# Patient Record
Sex: FEMALE | Race: ASIAN | NOT HISPANIC OR LATINO | Employment: FULL TIME | URBAN - METROPOLITAN AREA
[De-identification: names, ages, dates, MRNs, and addresses within clinical notes are randomized per-mention and may not be internally consistent; named-entity substitution may affect disease eponyms.]

---

## 2021-03-02 ENCOUNTER — HOSPITAL ENCOUNTER (OUTPATIENT)
Facility: MEDICAL CENTER | Age: 36
End: 2021-03-02
Attending: OBSTETRICS & GYNECOLOGY | Admitting: OBSTETRICS & GYNECOLOGY
Payer: COMMERCIAL

## 2022-08-13 LAB
HBV SURFACE AG SERPL QL IA: NORMAL
HIV 1+2 AB+HIV1 P24 AG SERPL QL IA: NORMAL

## 2022-11-05 LAB
ABO GROUP BLD: NORMAL
BLD GP AB SCN SERPL QL: NORMAL
RH BLD: NORMAL
RUBV IGG SERPL IA-ACNC: 9.1

## 2022-11-10 LAB
C TRACH DNA GENITAL QL NAA+PROBE: NORMAL
N GONORRHOEA DNA GENITAL QL NAA+PROBE: NORMAL

## 2023-04-25 LAB — GP B STREP DNA SPEC QL NAA+PROBE: NORMAL

## 2023-05-10 ENCOUNTER — HOSPITAL ENCOUNTER (INPATIENT)
Facility: MEDICAL CENTER | Age: 38
LOS: 2 days | End: 2023-05-13
Attending: OBSTETRICS & GYNECOLOGY | Admitting: OBSTETRICS & GYNECOLOGY
Payer: COMMERCIAL

## 2023-05-10 PROCEDURE — 302449 STATCHG TRIAGE ONLY (STATISTIC)

## 2023-05-11 ENCOUNTER — ANESTHESIA EVENT (OUTPATIENT)
Dept: ANESTHESIOLOGY | Facility: MEDICAL CENTER | Age: 38
End: 2023-05-11
Payer: COMMERCIAL

## 2023-05-11 ENCOUNTER — ANESTHESIA (OUTPATIENT)
Dept: ANESTHESIOLOGY | Facility: MEDICAL CENTER | Age: 38
End: 2023-05-11
Payer: COMMERCIAL

## 2023-05-11 LAB
BASOPHILS # BLD AUTO: 0.3 % (ref 0–1.8)
BASOPHILS # BLD: 0.03 K/UL (ref 0–0.12)
EOSINOPHIL # BLD AUTO: 0.08 K/UL (ref 0–0.51)
EOSINOPHIL NFR BLD: 0.7 % (ref 0–6.9)
ERYTHROCYTE [DISTWIDTH] IN BLOOD BY AUTOMATED COUNT: 43.6 FL (ref 35.9–50)
HCT VFR BLD AUTO: 39.2 % (ref 37–47)
HGB BLD-MCNC: 13.3 G/DL (ref 12–16)
HOLDING TUBE BB 8507: NORMAL
IMM GRANULOCYTES # BLD AUTO: 0.06 K/UL (ref 0–0.11)
IMM GRANULOCYTES NFR BLD AUTO: 0.5 % (ref 0–0.9)
LYMPHOCYTES # BLD AUTO: 2.15 K/UL (ref 1–4.8)
LYMPHOCYTES NFR BLD: 18.3 % (ref 22–41)
MCH RBC QN AUTO: 29.8 PG (ref 27–33)
MCHC RBC AUTO-ENTMCNC: 33.9 G/DL (ref 33.6–35)
MCV RBC AUTO: 87.7 FL (ref 81.4–97.8)
MONOCYTES # BLD AUTO: 0.82 K/UL (ref 0–0.85)
MONOCYTES NFR BLD AUTO: 7 % (ref 0–13.4)
NEUTROPHILS # BLD AUTO: 8.62 K/UL (ref 2–7.15)
NEUTROPHILS NFR BLD: 73.2 % (ref 44–72)
NRBC # BLD AUTO: 0 K/UL
NRBC BLD-RTO: 0 /100 WBC
PLATELET # BLD AUTO: 208 K/UL (ref 164–446)
PMV BLD AUTO: 11 FL (ref 9–12.9)
RBC # BLD AUTO: 4.47 M/UL (ref 4.2–5.4)
T PALLIDUM AB SER QL IA: NORMAL
WBC # BLD AUTO: 11.8 K/UL (ref 4.8–10.8)

## 2023-05-11 PROCEDURE — 700105 HCHG RX REV CODE 258: Performed by: OBSTETRICS & GYNECOLOGY

## 2023-05-11 PROCEDURE — 0HQ9XZZ REPAIR PERINEUM SKIN, EXTERNAL APPROACH: ICD-10-PCS | Performed by: OBSTETRICS & GYNECOLOGY

## 2023-05-11 PROCEDURE — 700102 HCHG RX REV CODE 250 W/ 637 OVERRIDE(OP): Performed by: OBSTETRICS & GYNECOLOGY

## 2023-05-11 PROCEDURE — 700111 HCHG RX REV CODE 636 W/ 250 OVERRIDE (IP): Performed by: STUDENT IN AN ORGANIZED HEALTH CARE EDUCATION/TRAINING PROGRAM

## 2023-05-11 PROCEDURE — 36415 COLL VENOUS BLD VENIPUNCTURE: CPT

## 2023-05-11 PROCEDURE — 700101 HCHG RX REV CODE 250: Performed by: STUDENT IN AN ORGANIZED HEALTH CARE EDUCATION/TRAINING PROGRAM

## 2023-05-11 PROCEDURE — 700111 HCHG RX REV CODE 636 W/ 250 OVERRIDE (IP): Performed by: OBSTETRICS & GYNECOLOGY

## 2023-05-11 PROCEDURE — 700101 HCHG RX REV CODE 250: Performed by: OBSTETRICS & GYNECOLOGY

## 2023-05-11 PROCEDURE — 59409 OBSTETRICAL CARE: CPT

## 2023-05-11 PROCEDURE — 770002 HCHG ROOM/CARE - OB PRIVATE (112)

## 2023-05-11 PROCEDURE — 303615 HCHG EPIDURAL/SPINAL ANESTHESIA FOR LABOR

## 2023-05-11 PROCEDURE — 85025 COMPLETE CBC W/AUTO DIFF WBC: CPT

## 2023-05-11 PROCEDURE — 304965 HCHG RECOVERY SERVICES

## 2023-05-11 PROCEDURE — 700105 HCHG RX REV CODE 258: Performed by: STUDENT IN AN ORGANIZED HEALTH CARE EDUCATION/TRAINING PROGRAM

## 2023-05-11 PROCEDURE — 700111 HCHG RX REV CODE 636 W/ 250 OVERRIDE (IP)

## 2023-05-11 PROCEDURE — 86780 TREPONEMA PALLIDUM: CPT

## 2023-05-11 PROCEDURE — 01967 NEURAXL LBR ANES VAG DLVR: CPT | Performed by: STUDENT IN AN ORGANIZED HEALTH CARE EDUCATION/TRAINING PROGRAM

## 2023-05-11 PROCEDURE — 10907ZC DRAINAGE OF AMNIOTIC FLUID, THERAPEUTIC FROM PRODUCTS OF CONCEPTION, VIA NATURAL OR ARTIFICIAL OPENING: ICD-10-PCS | Performed by: OBSTETRICS & GYNECOLOGY

## 2023-05-11 PROCEDURE — A9270 NON-COVERED ITEM OR SERVICE: HCPCS | Performed by: OBSTETRICS & GYNECOLOGY

## 2023-05-11 PROCEDURE — 10H07YZ INSERTION OF OTHER DEVICE INTO PRODUCTS OF CONCEPTION, VIA NATURAL OR ARTIFICIAL OPENING: ICD-10-PCS | Performed by: OBSTETRICS & GYNECOLOGY

## 2023-05-11 RX ORDER — IBUPROFEN 800 MG/1
800 TABLET ORAL
Status: COMPLETED | OUTPATIENT
Start: 2023-05-11 | End: 2023-05-11

## 2023-05-11 RX ORDER — EPHEDRINE SULFATE 50 MG/ML
5 INJECTION, SOLUTION INTRAVENOUS
Status: COMPLETED | OUTPATIENT
Start: 2023-05-11 | End: 2023-05-11

## 2023-05-11 RX ORDER — MISOPROSTOL 200 UG/1
800 TABLET ORAL
Status: DISCONTINUED | OUTPATIENT
Start: 2023-05-11 | End: 2023-05-11 | Stop reason: HOSPADM

## 2023-05-11 RX ORDER — BUPIVACAINE HYDROCHLORIDE 2.5 MG/ML
INJECTION, SOLUTION EPIDURAL; INFILTRATION; INTRACAUDAL
Status: COMPLETED
Start: 2023-05-11 | End: 2023-05-11

## 2023-05-11 RX ORDER — ACETAMINOPHEN 500 MG
1000 TABLET ORAL
Status: COMPLETED | OUTPATIENT
Start: 2023-05-11 | End: 2023-05-11

## 2023-05-11 RX ORDER — LIDOCAINE HYDROCHLORIDE 10 MG/ML
20 INJECTION, SOLUTION INFILTRATION; PERINEURAL
Status: DISCONTINUED | OUTPATIENT
Start: 2023-05-11 | End: 2023-05-11 | Stop reason: HOSPADM

## 2023-05-11 RX ORDER — ONDANSETRON 4 MG/1
4 TABLET, ORALLY DISINTEGRATING ORAL EVERY 6 HOURS PRN
Status: DISCONTINUED | OUTPATIENT
Start: 2023-05-11 | End: 2023-05-11 | Stop reason: HOSPADM

## 2023-05-11 RX ORDER — ROPIVACAINE HYDROCHLORIDE 2 MG/ML
INJECTION, SOLUTION EPIDURAL; INFILTRATION; PERINEURAL
Status: COMPLETED
Start: 2023-05-11 | End: 2023-05-11

## 2023-05-11 RX ORDER — ONDANSETRON 2 MG/ML
4 INJECTION INTRAMUSCULAR; INTRAVENOUS EVERY 6 HOURS PRN
Status: DISCONTINUED | OUTPATIENT
Start: 2023-05-11 | End: 2023-05-11 | Stop reason: HOSPADM

## 2023-05-11 RX ORDER — LIDOCAINE HYDROCHLORIDE AND EPINEPHRINE 15; 5 MG/ML; UG/ML
INJECTION, SOLUTION EPIDURAL
Status: COMPLETED | OUTPATIENT
Start: 2023-05-11 | End: 2023-05-11

## 2023-05-11 RX ORDER — BUPIVACAINE HYDROCHLORIDE 2.5 MG/ML
INJECTION, SOLUTION EPIDURAL; INFILTRATION; INTRACAUDAL
Status: COMPLETED | OUTPATIENT
Start: 2023-05-11 | End: 2023-05-11

## 2023-05-11 RX ORDER — ALUMINA, MAGNESIA, AND SIMETHICONE 2400; 2400; 240 MG/30ML; MG/30ML; MG/30ML
30 SUSPENSION ORAL EVERY 6 HOURS PRN
Status: DISCONTINUED | OUTPATIENT
Start: 2023-05-11 | End: 2023-05-11 | Stop reason: HOSPADM

## 2023-05-11 RX ORDER — SIMETHICONE 125 MG
125 TABLET,CHEWABLE ORAL 4 TIMES DAILY PRN
Status: DISCONTINUED | OUTPATIENT
Start: 2023-05-11 | End: 2023-05-13 | Stop reason: HOSPADM

## 2023-05-11 RX ORDER — DOCUSATE SODIUM 100 MG/1
100 CAPSULE, LIQUID FILLED ORAL 2 TIMES DAILY PRN
Status: DISCONTINUED | OUTPATIENT
Start: 2023-05-11 | End: 2023-05-13 | Stop reason: HOSPADM

## 2023-05-11 RX ORDER — LEVOTHYROXINE SODIUM 0.1 MG/1
100 TABLET ORAL
COMMUNITY

## 2023-05-11 RX ORDER — METHYLERGONOVINE MALEATE 0.2 MG/ML
0.2 INJECTION INTRAVENOUS
Status: DISCONTINUED | OUTPATIENT
Start: 2023-05-11 | End: 2023-05-13 | Stop reason: HOSPADM

## 2023-05-11 RX ORDER — CITRIC ACID/SODIUM CITRATE 334-500MG
30 SOLUTION, ORAL ORAL EVERY 6 HOURS PRN
Status: DISCONTINUED | OUTPATIENT
Start: 2023-05-11 | End: 2023-05-11 | Stop reason: HOSPADM

## 2023-05-11 RX ORDER — TERBUTALINE SULFATE 1 MG/ML
0.25 INJECTION, SOLUTION SUBCUTANEOUS
Status: DISCONTINUED | OUTPATIENT
Start: 2023-05-11 | End: 2023-05-11 | Stop reason: HOSPADM

## 2023-05-11 RX ORDER — MISOPROSTOL 200 UG/1
600 TABLET ORAL
Status: DISCONTINUED | OUTPATIENT
Start: 2023-05-11 | End: 2023-05-13 | Stop reason: HOSPADM

## 2023-05-11 RX ORDER — ROPIVACAINE HYDROCHLORIDE 2 MG/ML
INJECTION, SOLUTION EPIDURAL; INFILTRATION; PERINEURAL CONTINUOUS
Status: DISCONTINUED | OUTPATIENT
Start: 2023-05-11 | End: 2023-05-11

## 2023-05-11 RX ORDER — SODIUM CHLORIDE, SODIUM LACTATE, POTASSIUM CHLORIDE, CALCIUM CHLORIDE 600; 310; 30; 20 MG/100ML; MG/100ML; MG/100ML; MG/100ML
INJECTION, SOLUTION INTRAVENOUS PRN
Status: DISCONTINUED | OUTPATIENT
Start: 2023-05-11 | End: 2023-05-13 | Stop reason: HOSPADM

## 2023-05-11 RX ORDER — SODIUM CHLORIDE, SODIUM LACTATE, POTASSIUM CHLORIDE, AND CALCIUM CHLORIDE .6; .31; .03; .02 G/100ML; G/100ML; G/100ML; G/100ML
250 INJECTION, SOLUTION INTRAVENOUS PRN
Status: DISCONTINUED | OUTPATIENT
Start: 2023-05-11 | End: 2023-05-11

## 2023-05-11 RX ORDER — ACETAMINOPHEN 500 MG
1000 TABLET ORAL EVERY 6 HOURS PRN
Status: DISCONTINUED | OUTPATIENT
Start: 2023-05-11 | End: 2023-05-13 | Stop reason: HOSPADM

## 2023-05-11 RX ORDER — SODIUM CHLORIDE, SODIUM LACTATE, POTASSIUM CHLORIDE, CALCIUM CHLORIDE 600; 310; 30; 20 MG/100ML; MG/100ML; MG/100ML; MG/100ML
INJECTION, SOLUTION INTRAVENOUS CONTINUOUS
Status: DISCONTINUED | OUTPATIENT
Start: 2023-05-11 | End: 2023-05-13 | Stop reason: HOSPADM

## 2023-05-11 RX ORDER — OXYTOCIN 10 [USP'U]/ML
10 INJECTION, SOLUTION INTRAMUSCULAR; INTRAVENOUS
Status: DISCONTINUED | OUTPATIENT
Start: 2023-05-11 | End: 2023-05-11 | Stop reason: HOSPADM

## 2023-05-11 RX ORDER — SODIUM CHLORIDE, SODIUM LACTATE, POTASSIUM CHLORIDE, AND CALCIUM CHLORIDE .6; .31; .03; .02 G/100ML; G/100ML; G/100ML; G/100ML
1000 INJECTION, SOLUTION INTRAVENOUS
Status: COMPLETED | OUTPATIENT
Start: 2023-05-11 | End: 2023-05-11

## 2023-05-11 RX ORDER — CLINDAMYCIN PHOSPHATE 900 MG/50ML
900 INJECTION, SOLUTION INTRAVENOUS EVERY 8 HOURS
Status: COMPLETED | OUTPATIENT
Start: 2023-05-11 | End: 2023-05-12

## 2023-05-11 RX ORDER — METHYLERGONOVINE MALEATE 0.2 MG/ML
0.2 INJECTION INTRAVENOUS
Status: DISCONTINUED | OUTPATIENT
Start: 2023-05-11 | End: 2023-05-11 | Stop reason: HOSPADM

## 2023-05-11 RX ORDER — IBUPROFEN 800 MG/1
800 TABLET ORAL EVERY 8 HOURS PRN
Status: DISCONTINUED | OUTPATIENT
Start: 2023-05-11 | End: 2023-05-13 | Stop reason: HOSPADM

## 2023-05-11 RX ORDER — VITAMIN B COMPLEX
1000 TABLET ORAL
COMMUNITY

## 2023-05-11 RX ORDER — LEVOTHYROXINE SODIUM 0.1 MG/1
100 TABLET ORAL
Status: DISCONTINUED | OUTPATIENT
Start: 2023-05-11 | End: 2023-05-13 | Stop reason: HOSPADM

## 2023-05-11 RX ADMIN — FENTANYL CITRATE 100 MCG: 50 INJECTION, SOLUTION INTRAMUSCULAR; INTRAVENOUS at 01:32

## 2023-05-11 RX ADMIN — EPHEDRINE SULFATE 5 MG: 50 INJECTION INTRAVENOUS at 03:08

## 2023-05-11 RX ADMIN — SODIUM CHLORIDE, POTASSIUM CHLORIDE, SODIUM LACTATE AND CALCIUM CHLORIDE 1000 ML: 600; 310; 30; 20 INJECTION, SOLUTION INTRAVENOUS at 01:00

## 2023-05-11 RX ADMIN — EPHEDRINE SULFATE 5 MG: 50 INJECTION INTRAVENOUS at 02:28

## 2023-05-11 RX ADMIN — SODIUM CHLORIDE, POTASSIUM CHLORIDE, SODIUM LACTATE AND CALCIUM CHLORIDE: 600; 310; 30; 20 INJECTION, SOLUTION INTRAVENOUS at 03:54

## 2023-05-11 RX ADMIN — IBUPROFEN 800 MG: 800 TABLET, FILM COATED ORAL at 12:56

## 2023-05-11 RX ADMIN — LIDOCAINE HYDROCHLORIDE,EPINEPHRINE BITARTRATE 3 ML: 15; .005 INJECTION, SOLUTION EPIDURAL; INFILTRATION; INTRACAUDAL; PERINEURAL at 01:32

## 2023-05-11 RX ADMIN — BUPIVACAINE HYDROCHLORIDE 3 ML: 2.5 INJECTION, SOLUTION EPIDURAL; INFILTRATION; INTRACAUDAL at 01:32

## 2023-05-11 RX ADMIN — GENTAMICIN SULFATE 340 MG: 40 INJECTION, SOLUTION INTRAMUSCULAR; INTRAVENOUS at 13:44

## 2023-05-11 RX ADMIN — EPHEDRINE SULFATE 5 MG: 50 INJECTION INTRAVENOUS at 02:58

## 2023-05-11 RX ADMIN — ROPIVACAINE HYDROCHLORIDE 200 MG: 2 INJECTION, SOLUTION EPIDURAL; INFILTRATION at 01:46

## 2023-05-11 RX ADMIN — CLINDAMYCIN PHOSPHATE 900 MG: 900 INJECTION, SOLUTION INTRAVENOUS at 13:44

## 2023-05-11 RX ADMIN — OXYTOCIN 20 UNITS: 10 INJECTION, SOLUTION INTRAMUSCULAR; INTRAVENOUS at 12:53

## 2023-05-11 RX ADMIN — CLINDAMYCIN PHOSPHATE 900 MG: 900 INJECTION, SOLUTION INTRAVENOUS at 22:09

## 2023-05-11 RX ADMIN — SODIUM CHLORIDE, POTASSIUM CHLORIDE, SODIUM LACTATE AND CALCIUM CHLORIDE: 600; 310; 30; 20 INJECTION, SOLUTION INTRAVENOUS at 02:11

## 2023-05-11 RX ADMIN — OXYTOCIN 125 ML/HR: 10 INJECTION, SOLUTION INTRAMUSCULAR; INTRAVENOUS at 16:21

## 2023-05-11 RX ADMIN — OXYTOCIN 1 MILLI-UNITS/MIN: 10 INJECTION, SOLUTION INTRAMUSCULAR; INTRAVENOUS at 05:13

## 2023-05-11 RX ADMIN — ROPIVACAINE HYDROCHLORIDE 200 MG: 2 INJECTION, SOLUTION EPIDURAL; INFILTRATION; PERINEURAL at 01:46

## 2023-05-11 RX ADMIN — ACETAMINOPHEN 1000 MG: 500 TABLET, FILM COATED ORAL at 10:48

## 2023-05-11 ASSESSMENT — LIFESTYLE VARIABLES
ON A TYPICAL DAY WHEN YOU DRINK ALCOHOL HOW MANY DRINKS DO YOU HAVE: 0
EVER FELT BAD OR GUILTY ABOUT YOUR DRINKING: NO
HOW MANY TIMES IN THE PAST YEAR HAVE YOU HAD 5 OR MORE DRINKS IN A DAY: 0
TOTAL SCORE: 0
HAVE PEOPLE ANNOYED YOU BY CRITICIZING YOUR DRINKING: NO
CONSUMPTION TOTAL: NEGATIVE
EVER_SMOKED: NEVER
AVERAGE NUMBER OF DAYS PER WEEK YOU HAVE A DRINK CONTAINING ALCOHOL: 0
DOES PATIENT WANT TO STOP DRINKING: NO
TOTAL SCORE: 0
EVER HAD A DRINK FIRST THING IN THE MORNING TO STEADY YOUR NERVES TO GET RID OF A HANGOVER: NO
ALCOHOL_USE: NO
TOTAL SCORE: 0
HAVE YOU EVER FELT YOU SHOULD CUT DOWN ON YOUR DRINKING: NO

## 2023-05-11 ASSESSMENT — PATIENT HEALTH QUESTIONNAIRE - PHQ9
1. LITTLE INTEREST OR PLEASURE IN DOING THINGS: NOT AT ALL
SUM OF ALL RESPONSES TO PHQ9 QUESTIONS 1 AND 2: 0
SUM OF ALL RESPONSES TO PHQ9 QUESTIONS 1 AND 2: 0
1. LITTLE INTEREST OR PLEASURE IN DOING THINGS: NOT AT ALL
2. FEELING DOWN, DEPRESSED, IRRITABLE, OR HOPELESS: NOT AT ALL
2. FEELING DOWN, DEPRESSED, IRRITABLE, OR HOPELESS: NOT AT ALL

## 2023-05-11 ASSESSMENT — PAIN SCALES - GENERAL: PAIN_LEVEL: 0

## 2023-05-11 NOTE — H&P
Date: 2023    Patient ID: 37-year-old  3 para 1-0-1-1 at 39+2 by ultrasound (EDC 2023)    Chief complaint: Contractions.    History of present illness: The patient has prenatal care with Dr. Montes De Oca.  Her pregnancy has been complicated by advanced maternal age as well as Hashimoto's thyroiditis.  She had low risk NIPT testing and has been seen Dr. Young throughout her pregnancy.  The fetus had a normal anatomy ultrasound with BX exception of a small VSD (VSD not visualized at 32 weeks).  The patient's 1 hour glucose tolerance test was 143 and her 3-hour glucose tolerance test was normal.  She is GBS negative.     She presented to [facility] labor and delivery with a chief complaint of contractions.  She was found to be 5 to 6 cm / 90% effaced/-2 fetal station.  She endorses positive fetal movement.  She denies vaginal bleeding or loss of fluid.    Review of systems: Denies fevers, chills, shortness of breath/chest pain, nausea/vomiting, diarrhea or dysuria.    Past medical history: Hashimoto's thyroiditis.    Past surgical history: Denies.    Medications: Levothyroxine 75 mcg once a day, vitamin D3 and prenatal vitamins.    Family history: Mother with hypertension    Social history: Denies tobacco use, alcohol use or drug use.  The father the baby's name is Vazquez.    GYN history: Last menstrual period 2022.  Denies history of sexually transmitted infections or genital herpes.    OB history: G1: Normal spontaneous vaginal delivery, male (Indra), birth weight 9 pounds, third degree perineal laceration  G2: Ectopic pregnancy treated with methotrexate ()  G3: Current, prenatal care with Dr. Montes De Oca.    Allergies: No known drug allergies.    Physical exam:  Vital signs: Temperature 98.1, pulse 80, respiratory rate 18, blood pressure 120/80, O2 sat greater than 95% on room air  General: Alert, conversational, pleasant, no acute distress  Cardiovascular: Regular rate  Pulmonary: Respiratory distress,  symmetric expansion  Abdomen: Soft, nontender, nondistended, gravid  Genitourinary: 5 to 6 cm / 90% effaced/-2 fetal station per RN  Extremities: Moves all, no edema    NST: Baseline 140s, moderate variability, positive accelerations, occasional variable decelerations as well as on occasion early decelerations, tocometer contractions every 3 minutes.    Laboratory studies: Prenatal labs reviewed: NIPT low risk urine culture no growth rubella immune be positive antibody -1-hour glucose tolerance test 143, 3-hour glucose tolerance test normal, GBS negative gonorrhea/chlamydia/trichomonas negative, Pap smear atypical squamous cells of undetermined significance, HPV negative, HIV nonreactive hepatitis B surface antigen nonreactive, RPR nonreactive    Imaging: Anatomy ultrasound report shows a single live intrauterine pregnancy with normal anatomy.  The infant did have a small muscular VSD which resolved in the third trimester.  Growth ultrasound at 34 weeks showed an estimated fetal weight of 5 pounds 2 ounces (52%)    Assessment/plan:  37-year-old  3 para 1-0-1-1 at 39+2 weeks by ultrasound (EDC 2023)  1.  Term intrauterine pregnancy at 39+2 weeks.  2.  Active labor.  3.  AMA.  4.  Hashimoto's thyroiditis.    Plan:  1.  Admit to labor and delivery.  2.  CBC/type and screen.  3.  Continuous fetal heart tracing/tocometer.  4.  Epidural as needed for pain.  5.  Expectant management.  6.  Clear liquid diet.    Rosa Maria Puckett MD

## 2023-05-11 NOTE — ANESTHESIA PROCEDURE NOTES
Epidural Block    Date/Time: 5/11/2023 1:32 AM    Performed by: Torito Banks D.O.  Authorized by: Torito Banks D.O.    Patient Location:  OB  Start Time:  5/11/2023 1:32 AM  End Time:  5/11/2023 1:40 AM  Reason for Block: labor analgesia    patient identified, IV checked, site marked, risks and benefits discussed, surgical consent, monitors and equipment checked and pre-op evaluation    Patient Position:  Sitting  Prep: ChloraPrep, patient draped and sterile technique    Monitoring:  Blood pressure, continuous pulse oximetry and heart rate  Approach:  Midline  Location:  L3-L4  Injection Technique:  WESTON air and WESTON saline  Skin infiltration:  Lidocaine  Strength:  1%  Dose:  3ml  Needle Type:  Tuohy  Needle Gauge:  17 G  Needle Length:  3.5 in  Loss of resistance::  6  Catheter Size:  19 G  Catheter at Skin Depth:  13  Test Dose Result:  Negative

## 2023-05-11 NOTE — ANESTHESIA POSTPROCEDURE EVALUATION
Patient: Sue Ferris    Procedure Summary     Date: 05/11/23 Room / Location:     Anesthesia Start: 0122 Anesthesia Stop: 1208    Procedure: Labor Epidural Diagnosis:     Scheduled Providers:  Responsible Provider: Farnaz Trevino M.D.    Anesthesia Type: epidural ASA Status: 2          Final Anesthesia Type: epidural  Last vitals  BP   Blood Pressure: 114/65    Temp   36.2 °C (97.2 °F)    Pulse   (!) 127   Resp   18    SpO2   99 %      Anesthesia Post Evaluation    Patient location during evaluation: PACU  Patient participation: complete - patient participated  Level of consciousness: awake and alert  Pain score: 0    Airway patency: patent  Anesthetic complications: no  Cardiovascular status: adequate  Respiratory status: acceptable  Hydration status: acceptable    PONV: none          No notable events documented.     Nurse Pain Score: 0 (NPRS)

## 2023-05-11 NOTE — ANESTHESIA TIME REPORT
Anesthesia Start and Stop Event Times     Date Time Event    5/11/2023 0122 Ready for Procedure     0122 Anesthesia Start     1208 Anesthesia Stop        Responsible Staff  05/11/23    Name Role Begin End    Torito Banks D.O. Anesth 0122 0714    Farnaz Trevino M.D. Anesth 0714 1208        Overtime Reason:  no overtime (within assigned shift)    Comments:

## 2023-05-11 NOTE — ANESTHESIA PREPROCEDURE EVALUATION
Date: 05/11/23  Procedure: Labor Epidural         Relevant Problems   No relevant active problems       Physical Exam    Airway   Mallampati: II  TM distance: >3 FB  Neck ROM: full       Cardiovascular - normal exam  Rhythm: regular  Rate: normal  (-) murmur     Dental - normal exam           Pulmonary - normal exam  Breath sounds clear to auscultation     Abdominal    Neurological - normal exam                 Anesthesia Plan    ASA 2       Plan - epidural   Neuraxial block will be labor analgesia                  Pertinent diagnostic labs and testing reviewed    Informed Consent:    Anesthetic plan and risks discussed with patient.

## 2023-05-11 NOTE — PROGRESS NOTES
OBPN    Called by RN and notified pt with temp of 100.3; fetal tachycardia    Pt complaining of lots of pressure  SVe 9/c/0 sta  EFM- baseline 170-180's, minimal variability, did respond to scalp stim    A&P/   Labor progressing well    Fetal tachycardia with elevated temp, tylenol given and will add abx if needed    Anticipate she will deliver soon.

## 2023-05-11 NOTE — PROGRESS NOTES
0015- Received report from JOSE Andrade. POC discussed. Patient transferred from LDA 2 to room 216. External toco and FHM applied to patient. IV initiated. Labs drawn and sent. Admit profile completed.     0030- Patient would like an epidural. LR bolus initiated.     0126- Dr. Banks at bedside for epidural procedure.     0317- Phone call out to Dr. Banks. Patient received 3 doses of ephedrine and LR bolus, SBP 90's. Received orders to change continuous rate of epidural infusion from 10 to 8.     0442- SVE performed, unchanged; please refer to flowsheets. Received orders to start pitocin; please refer to MAR.     0700- Report given to JOSE Ann. POC discussed.

## 2023-05-11 NOTE — PROGRESS NOTES
2345: pt  here at 39w2d with c/o ctx q 7-9 mins for the past few hours. They started a couple days ago. She reports light pink spotting and +FM. Pt denies LOF. SVE /-2  0010: Dr. Puckett notified, admit orders received

## 2023-05-11 NOTE — CARE PLAN
The patient is Stable - Low risk of patient condition declining or worsening    Shift Goals  Clinical Goals: Healthy baby and delivery.  Patient Goals: Sleep and pain relief.    Progress made toward(s) clinical / shift goals:    Problem: Psychosocial - L&D  Goal: Patient's level of anxiety will decrease  Outcome: Progressing     Problem: Risk for Venous Thromboembolism (VTE)  Goal: VTE prevention measures will be implemented and patient will remain free from VTE  Outcome: Progressing     Problem: Risk for Injury  Goal: Patient and fetus will be free of preventable injury/complications  Outcome: Progressing       Patient is not progressing towards the following goals:

## 2023-05-11 NOTE — PROGRESS NOTES
LABOR PROGRESS NOTE:    Subjective: Patient reports that she is doing well. She is not feeling contractions. Reports her pain is 0/10.  Denies headaches, changes in vision, nausea, vomiting, chest pain or shortness of breath.     Objective:   Vitals:    23 0729   BP: 114/71   Pulse: 100   Resp:    Temp:    SpO2:         Physical Exam:   General: Alert, conversational, pleasant, no acute distress  Cardiovascular: Regular rate   Pulmonary: No respiratory distress, symmetric expansion   Abdomen: Gravid, soft in between contractions, non-distended, non-tender   Genitourinary:    Wolff: PRESent   SVE: /-2; arom-clear  Extremities: Trace edema, moves all       FHT: cat 1      Labs:  Lab Results   Component Value Date/Time    WBC 11.8 (H) 2023 0020     RPR,EXTHEPBSAG,EXTHGB,EXTHCT,EXTGLUF,EXTGBS)@    Assessment and Plan:   Sue Ferris is a 37 y.o.  at 39w3d    Assessment:  - SLIUP at 39w3d -         Plan:  - Repeat SVE in 2-3 hours  - Continuous fetal heart tracing / tocometer  - NPO: ice chips/ sips with meds  - Pain control: comfortable with epidural      Gretchen Carrasco M.D.

## 2023-05-12 LAB
BASOPHILS # BLD AUTO: 0.2 % (ref 0–1.8)
BASOPHILS # BLD: 0.02 K/UL (ref 0–0.12)
EOSINOPHIL # BLD AUTO: 0.06 K/UL (ref 0–0.51)
EOSINOPHIL NFR BLD: 0.5 % (ref 0–6.9)
ERYTHROCYTE [DISTWIDTH] IN BLOOD BY AUTOMATED COUNT: 46.8 FL (ref 35.9–50)
HCT VFR BLD AUTO: 34.7 % (ref 37–47)
HGB BLD-MCNC: 11.2 G/DL (ref 12–16)
IMM GRANULOCYTES # BLD AUTO: 0.08 K/UL (ref 0–0.11)
IMM GRANULOCYTES NFR BLD AUTO: 0.6 % (ref 0–0.9)
LYMPHOCYTES # BLD AUTO: 1.85 K/UL (ref 1–4.8)
LYMPHOCYTES NFR BLD: 13.9 % (ref 22–41)
MCH RBC QN AUTO: 28.6 PG (ref 27–33)
MCHC RBC AUTO-ENTMCNC: 32.3 G/DL (ref 33.6–35)
MCV RBC AUTO: 88.7 FL (ref 81.4–97.8)
MONOCYTES # BLD AUTO: 0.94 K/UL (ref 0–0.85)
MONOCYTES NFR BLD AUTO: 7.1 % (ref 0–13.4)
NEUTROPHILS # BLD AUTO: 10.33 K/UL (ref 2–7.15)
NEUTROPHILS NFR BLD: 77.7 % (ref 44–72)
NRBC # BLD AUTO: 0 K/UL
NRBC BLD-RTO: 0 /100 WBC
PLATELET # BLD AUTO: 156 K/UL (ref 164–446)
PMV BLD AUTO: 10.6 FL (ref 9–12.9)
RBC # BLD AUTO: 3.91 M/UL (ref 4.2–5.4)
WBC # BLD AUTO: 13.3 K/UL (ref 4.8–10.8)

## 2023-05-12 PROCEDURE — 36415 COLL VENOUS BLD VENIPUNCTURE: CPT

## 2023-05-12 PROCEDURE — A9270 NON-COVERED ITEM OR SERVICE: HCPCS | Performed by: OBSTETRICS & GYNECOLOGY

## 2023-05-12 PROCEDURE — 700102 HCHG RX REV CODE 250 W/ 637 OVERRIDE(OP): Performed by: OBSTETRICS & GYNECOLOGY

## 2023-05-12 PROCEDURE — 700101 HCHG RX REV CODE 250: Performed by: OBSTETRICS & GYNECOLOGY

## 2023-05-12 PROCEDURE — 770002 HCHG ROOM/CARE - OB PRIVATE (112)

## 2023-05-12 PROCEDURE — 85025 COMPLETE CBC W/AUTO DIFF WBC: CPT

## 2023-05-12 RX ADMIN — LEVOTHYROXINE SODIUM 100 MCG: 0.1 TABLET ORAL at 10:28

## 2023-05-12 RX ADMIN — CLINDAMYCIN PHOSPHATE 900 MG: 900 INJECTION, SOLUTION INTRAVENOUS at 05:42

## 2023-05-12 ASSESSMENT — PAIN DESCRIPTION - PAIN TYPE: TYPE: ACUTE PAIN

## 2023-05-12 NOTE — CARE PLAN
The patient is Stable - Low risk of patient condition declining or worsening    Shift Goals  Clinical Goals: Infant will maintain stable VS; Lochia WDL; Pain WDL  Patient Goals: Sleep and pain relief.    Progress made toward(s) clinical / shift goals:    Problem: Pain - Standard  Goal: Alleviation of pain or a reduction in pain to the patient’s comfort goal  Outcome: Progressing     Problem: Knowledge Deficit - Standard  Goal: Patient and family/care givers will demonstrate understanding of plan of care, disease process/condition, diagnostic tests and medications  Outcome: Progressing     Problem: Knowledge Deficit - Postpartum  Goal: Patient will verbalize and demonstrate understanding of self and infant care  Outcome: Progressing     Problem: Psychosocial - Postpartum  Goal: Patient will verbalize and demonstrate effective bonding and parenting behavior  Outcome: Progressing     Problem: Altered Physiologic Condition  Goal: Patient physiologically stable as evidenced by normal lochia, palpable uterine involution and vitals within normal limits  Outcome: Progressing     Problem: Infection - Postpartum  Goal: Postpartum patient will be free of signs and symptoms of infection  Outcome: Progressing     Problem: Respiratory/Oxygenation Function Post-Surgical  Goal: Patient will achieve/maintain normal respiratory rate/effort  Outcome: Progressing     Problem: Bowel Elimination - Post Surgical  Goal: Patient will resume regular bowel sounds and function with no discomfort or distention  Outcome: Progressing     Problem: Early Mobilization - Post Surgery  Goal: Early mobilization post surgery  Outcome: Progressing

## 2023-05-12 NOTE — PROGRESS NOTES
PPD1    S/ doing well, tired, baby breastfeeding, no more fevers    O/  Vitals:    23 1800 23 2217 23 0200 23 0600   BP: 107/61 102/65 107/65 110/71   Pulse: 95 94 86 88   Resp: 18 16 17 18   Temp: 36.5 °C (97.7 °F) 36.3 °C (97.4 °F) 36.6 °C (97.9 °F) 36.1 °C (97 °F)   TempSrc: Temporal Temporal Temporal Temporal   SpO2: 96% 96% 96% 94%   Weight:       Height:          Gen-comf  Abd-soft, ff at umb, mild ttp  Ext-no edema    CBC pending    A&P/PPD1 G2 now P2 s/p   Maternal temp to 101 during pushing so on 24hrs abx post delivery; will watch today and anticipate home tomorrow am.   Otherwise, routine care.

## 2023-05-12 NOTE — PROGRESS NOTES
Received report from Thania GARCIA. Educated pt on today's POC. All questions answered at this time. Call light within reach.     1200 Spoke with pt about not seeing her doctor this morning. I told pt that Dr. Carrasco came to see her around 0640 this morning. Pt does not remember. Asked pt if maybe she was sleeping or tired during this encounter. Pt said she would have remembered. Spoke with Dr. Montes De Oca and she said she would stop by and see pt today.

## 2023-05-12 NOTE — PROGRESS NOTES
Bedside report recvd and pt care assumed.  Fob at bedside.  Fundus firm lochia light.  Pt up self and steady.  Denies pain. PT breastfeeding independently.

## 2023-05-12 NOTE — L&D DELIVERY NOTE
DATE OF SERVICE:  2023     DELIVERY NOTE     This is a 37-year-old  2, para 1-0-0-1 at 39 weeks and 3 days.  This is   a patient of Dr. Costello.  Her pregnancy is complicated by advanced maternal   age status.  She has been seen by Dr. Young throughout her pregnancy.  She did   have a low risk NIPT.  On anatomy ultrasound, there was a small VSD.  This   resolved by 32 weeks.  Mom also has Hashimoto's thyroiditis and she has been   on thyroid replacement of levothyroxine 75 mcg once a day.     The patient presented complaining of regular contractions, was found to be 5   cm dilated and was requesting an epidural.  She was admitted, received an   epidural for pain control.  When I took over this morning, she was 6-7 cm   dilated, comfortable with an epidural.  She underwent artificial rupture of   membranes, clear fluid was noted.  Shortly thereafter, fetal heart tracing   changed from category 1 to category 2 with repetitive variable decelerations.    IUPC was placed with position changes the variables improved.  The patient   went on to progress to complete when the anterior lip was noted to have a   temperature of 100.3 and fetal heart tracing showed an elevated fetal heart   rate in the 170s to 180s.  Mom was given one dose of Tylenol and she started   pushing.  During the pushing process, she did spike a temperature up to 101.    She pushed for about 40 minutes to go on to deliver a male infant from OA   presentation.  Delivery of the head was manual assisted, there was a double   nuchal cord that was tight that had to be doubly clamped and cut on the   perineum, shoulder and the rest of body followed without difficulty.  We had   respiratory therapy present and baby was taken immediately to the warmer for   poor tone.  A segment of the umbilical cord was handed off for cord gases.  IV   Pitocin was started and the placenta delivered spontaneously and intact with   3-vessel cord.  The uterus firmed up  nicely with Pitocin and bimanual massage.    Inspection of the vagina and perineum revealed a small first-degree   laceration that was repaired with a single figure-of-eight suture using 3-0   Vicryl.  Total  mL.  Baby's Apgars are 8 and 8, weight is pending.  Mom   and baby are doing well.  Sponge and needle counts are correct.        ______________________________  MD CARMEN He/AB/NESHA    DD:  05/11/2023 12:31  DT:  05/11/2023 20:51    Job#:  [id number]

## 2023-05-12 NOTE — PROGRESS NOTES
1530 Assume care from L&D RN Mariluz. Oriented patient to room,call light, and emergancy light. Assessment completed,fundus firm,lochia light. Plan of care reviewed, verbilized understanding. Patient denies pain at this time, will call if intervention needed.

## 2023-05-12 NOTE — CARE PLAN
The patient is Stable - Low risk of patient condition declining or worsening    Shift Goals  Clinical Goals: VSS, pain control, light lochia  Patient Goals: pain control, breast feeding  Family Goals: bonding    Progress made toward(s) clinical / shift goals:    Problem: Pain - Standard  Goal: Alleviation of pain or a reduction in pain to the patient’s comfort goal  Outcome: Progressing     Problem: Knowledge Deficit - Standard  Goal: Patient and family/care givers will demonstrate understanding of plan of care, disease process/condition, diagnostic tests and medications  Outcome: Progressing     Problem: Infection - Postpartum  Goal: Postpartum patient will be free of signs and symptoms of infection  Outcome: Progressing     Problem: Early Mobilization - Post Surgery  Goal: Early mobilization post surgery  Outcome: Progressing       Patient is not progressing towards the following goals:

## 2023-05-12 NOTE — LACTATION NOTE
This note was copied from a baby's chart.  Baby 39.3 weeks, , baby 23 hours old. MOB reports she breast fed 1st baby, declines lactation assistance at this time. Discussed breastfeeding 8 or more times in 24 hours & no longer than 3.5 hours from last feed. Discussed cluster feeding as normal behavior.     MOB has [employer] insurance, NNB Resource sheet provided.     Breastfeeding plan:  Breastfeed on cue a minimum 8 or more times in 24 hours no longer than 3-4 hours from last feed.

## 2023-05-13 VITALS
WEIGHT: 170 LBS | OXYGEN SATURATION: 97 % | BODY MASS INDEX: 28.32 KG/M2 | HEIGHT: 65 IN | SYSTOLIC BLOOD PRESSURE: 101 MMHG | TEMPERATURE: 97.6 F | RESPIRATION RATE: 17 BRPM | HEART RATE: 68 BPM | DIASTOLIC BLOOD PRESSURE: 78 MMHG

## 2023-05-13 PROCEDURE — 700102 HCHG RX REV CODE 250 W/ 637 OVERRIDE(OP): Performed by: OBSTETRICS & GYNECOLOGY

## 2023-05-13 PROCEDURE — A9270 NON-COVERED ITEM OR SERVICE: HCPCS | Performed by: OBSTETRICS & GYNECOLOGY

## 2023-05-13 RX ADMIN — LEVOTHYROXINE SODIUM 100 MCG: 0.1 TABLET ORAL at 06:25

## 2023-05-13 ASSESSMENT — EDINBURGH POSTNATAL DEPRESSION SCALE (EPDS)
I HAVE FELT SAD OR MISERABLE: NO, NOT AT ALL
I HAVE BLAMED MYSELF UNNECESSARILY WHEN THINGS WENT WRONG: NO, NEVER
THINGS HAVE BEEN GETTING ON TOP OF ME: NO, MOST OF THE TIME I HAVE COPED QUITE WELL
I HAVE BEEN SO UNHAPPY THAT I HAVE BEEN CRYING: NO, NEVER
I HAVE BEEN SO UNHAPPY THAT I HAVE HAD DIFFICULTY SLEEPING: NOT VERY OFTEN
I HAVE BEEN ABLE TO LAUGH AND SEE THE FUNNY SIDE OF THINGS: AS MUCH AS I ALWAYS COULD
I HAVE BEEN ANXIOUS OR WORRIED FOR NO GOOD REASON: NO, NOT AT ALL
I HAVE FELT SCARED OR PANICKY FOR NO GOOD REASON: NO, NOT AT ALL
THE THOUGHT OF HARMING MYSELF HAS OCCURRED TO ME: NEVER
I HAVE LOOKED FORWARD WITH ENJOYMENT TO THINGS: AS MUCH AS I EVER DID

## 2023-05-13 NOTE — PROGRESS NOTES
Progress Note    Sue Ferris   PP day 2  Chief Admitting Dx: Labor and delivery indication for care or intervention [O75.9]  Delivery Type: vaginal, spontaneous      Subjective:  Ambulating:voiding, tolerating diet, normal lochia, pain controlled. No fevers off antibiotics.     Vitals:    23 0200 23 0600 23 1751 23 0600   BP: 107/65 110/71 122/80 101/78   Pulse: 86 88 79 68   Resp: 17 18 18 17   Temp: 36.6 °C (97.9 °F) 36.1 °C (97 °F) 36.8 °C (98.3 °F) 36.4 °C (97.6 °F)   TempSrc: Temporal Temporal Temporal Temporal   SpO2: 96% 94% 97% 97%   Weight:       Height:           Exam:  Gen: no acute distress  Abdomen:soft nt/nd  Ext: no calf pain kayden LE    Labs:   No results found for this or any previous visit (from the past 24 hour(s)).    Assessment:  Ppd 2  S/p   GBS positive  Chorio/one elevated temp now off antibiotics    Plan:  Discharge home  Encourage ambulation  Pelvic rest, no heavy lifting/pulling /pushing  F/u in my office  6 weeks postpartum  Continue prenatal vitamins daily  Give Rhogam if Rh negative and indicated  Give MMR if indcated prior to discharge  Encourage breastfeeding      Yeny Montes De Oca M.D.

## 2023-05-13 NOTE — CARE PLAN
The patient is Stable - Low risk of patient condition declining or worsening    Shift Goals  Clinical Goals: manage pain, breast feed  Patient Goals: rest  Family Goals: bond    Progress made toward(s) clinical / shift goals:    Problem: Pain - Standard  Goal: Alleviation of pain or a reduction in pain to the patient’s comfort goal  Description: Target End Date:  Prior to discharge or change in level of care    Document on Vitals flowsheet    1.  Document pain using the appropriate pain scale per order or unit policy  2.  Educate and implement non-pharmacologic comfort measures (i.e. relaxation, distraction, massage, cold/heat therapy, etc.)  3.  Pain management medications as ordered  4.  Reassess pain after pain med administration per policy  5.  If opiods administered assess patient's response to pain medication is appropriate per POSS sedation scale  6.  Follow pain management plan developed in collaboration with patient and interdisciplinary team (including palliative care or pain specialists if applicable)  Outcome: Progressing     Problem: Knowledge Deficit - Postpartum  Goal: Patient will verbalize and demonstrate understanding of self and infant care  Description: Target End Date:  1-3 days or as soon as patient condition allows    Document in Patient Education    1.  Assess patient and knowledge of self and infant care  2.  Educate patient verbally, by demonstration and written material  Outcome: Progressing       Patient is not progressing towards the following goals:

## 2023-05-13 NOTE — PROGRESS NOTES
Assessment completed. Fundus is firm, lochia light, patient voiding normally. Pt ambulating independently. Bands verified matching baby. Needs are met at this time. Encouraged to call with any needs.

## 2023-05-13 NOTE — DISCHARGE INSTRUCTIONS

## 2023-05-13 NOTE — PROGRESS NOTES
Received report from Oliva GARCIA. Educated pt on today's POC. All questions answered at this time. Call light within reach.     1100 Reviewed discharge paperwork with MOB. No further questions. Verified bands and removed cuddles. Asked MOB to notify RN when ready to go to check car seat    1155 Car seat checked and MOB and family walked out.

## 2023-05-13 NOTE — DISCHARGE SUMMARY
Discharge Summary:      Sue Ferris    Admit Date:   5/10/2023  Discharge Date:  2023     Admitting diagnosis:  Labor and delivery indication for care or intervention [O75.9]  Discharge Diagnosis: Status post vaginal, spontaneous.  Pregnancy Complications: one elevated temp post delivery  Tubal Ligation:  no        History:  History reviewed. No pertinent past medical history.  OB History    Para Term  AB Living   2 2 2     2   SAB IAB Ectopic Molar Multiple Live Births           0 2      # Outcome Date GA Lbr Thomas/2nd Weight Sex Delivery Anes PTL Lv   2 Term 23 39w3d / 00:40 3.59 kg (7 lb 14.6 oz) M Vag-Spont EPI N MAXIMILIAN   1 Term 19    M Vag-Spont  N MAXIMILIAN        Patient has no known allergies.  Patient Active Problem List    Diagnosis Date Noted    Labor and delivery indication for care or intervention 2023        Hospital Course:   37 y.o. , now para 2, was admitted with the above mentioned diagnosis, underwent Active Labor, vaginal, spontaneous. Patient postpartum course was unremarkable, with progressive advancement in diet , ambulation and toleration of oral analgesia. Patient without complaints today and desires discharge.      Vitals:    23 0200 23 0600 23 1751 23 0600   BP: 107/65 110/71 122/80 101/78   Pulse: 86 88 79 68   Resp: 17 18 18 17   Temp: 36.6 °C (97.9 °F) 36.1 °C (97 °F) 36.8 °C (98.3 °F) 36.4 °C (97.6 °F)   TempSrc: Temporal Temporal Temporal Temporal   SpO2: 96% 94% 97% 97%   Weight:       Height:           Current Facility-Administered Medications   Medication Dose    LR infusion      oxytocin (Pitocin) infusion (for post delivery)  125 mL/hr    levothyroxine (SYNTHROID) tablet 100 mcg  100 mcg    oxytocin (Pitocin) 0.02 Units/mL LR (induction of labor)  0.5-20 terri-units/min    lactated ringers infusion      docusate sodium (COLACE) capsule 100 mg  100 mg    ibuprofen (MOTRIN) tablet 800 mg  800 mg    acetaminophen  (TYLENOL) tablet 1,000 mg  1,000 mg    measles, mumps and rubella vaccine (MMR) injection 0.5 mL  0.5 mL    methylergonovine (METHERGINE) injection 0.2 mg  0.2 mg    miSOPROStol (CYTOTEC) tablet 600 mcg  600 mcg    PRN oxytocin (PITOCIN) (20 Units/1000 mL) PRN for excessive uterine bleeding - See Admin Instr  125-999 mL/hr    simethicone (Mylicon) chewable tablet 125 mg  125 mg       Exam:  See todays note for exam    Labs: blood type B+  Recent Labs     05/11/23  0020 05/12/23  0655   WBC 11.8* 13.3*   RBC 4.47 3.91*   HEMOGLOBIN 13.3 11.2*   HEMATOCRIT 39.2 34.7*   MCV 87.7 88.7   MCH 29.8 28.6   MCHC 33.9 32.3*   RDW 43.6 46.8   PLATELETCT 208 156*   MPV 11.0 10.6        Activity:   Discharge to home  Pelvic Rest x 6 weeks    Assessment:  normal postpartum course  Discharge Assessment: Will dc home today  Encourage ambulation  Pelvic rest, no heavy lifting/pulling /pushing  F/u in my office  6 weeks postpartum  Continue prenatal vitamins daily  Give Rhogam if Rh negative and indicated  Give MMR if indcated prior to discharge  Encourage breastfeeding  Continue synthroid   may take over the counter tylenol/motrin if needed     Follow up: .6 weeks Dr Montes De Oca    Discharge Meds:   No current outpatient medications on file.   Continue synthroid  Prenatal vitamin  Tylenol or motrin over the counter as needed for pain    Yeny Montes De Oca M.D.

## 2025-01-20 ENCOUNTER — APPOINTMENT (OUTPATIENT)
Dept: CARDIOLOGY | Facility: MEDICAL CENTER | Age: 40
End: 2025-01-20
Attending: INTERNAL MEDICINE
Payer: COMMERCIAL

## 2025-08-15 ENCOUNTER — OFFICE VISIT (OUTPATIENT)
Dept: MEDICAL GROUP | Facility: IMAGING CENTER | Age: 40
End: 2025-08-15
Payer: COMMERCIAL

## 2025-08-15 VITALS
RESPIRATION RATE: 16 BRPM | HEART RATE: 67 BPM | OXYGEN SATURATION: 95 % | TEMPERATURE: 97.6 F | HEIGHT: 65 IN | DIASTOLIC BLOOD PRESSURE: 60 MMHG | WEIGHT: 138 LBS | SYSTOLIC BLOOD PRESSURE: 110 MMHG | BODY MASS INDEX: 22.99 KG/M2

## 2025-08-15 DIAGNOSIS — E04.1 THYROID NODULE: ICD-10-CM

## 2025-08-15 DIAGNOSIS — E06.3 HYPOTHYROIDISM DUE TO HASHIMOTO THYROIDITIS: ICD-10-CM

## 2025-08-15 DIAGNOSIS — Z11.59 NEED FOR HEPATITIS C SCREENING TEST: ICD-10-CM

## 2025-08-15 DIAGNOSIS — E55.9 VITAMIN D DEFICIENCY: Primary | ICD-10-CM

## 2025-08-15 DIAGNOSIS — Z11.59 NEED FOR HEPATITIS B SCREENING TEST: ICD-10-CM

## 2025-08-15 DIAGNOSIS — Z00.00 WELLNESS EXAMINATION: ICD-10-CM

## 2025-08-15 DIAGNOSIS — Z76.89 ENCOUNTER TO ESTABLISH CARE: ICD-10-CM

## 2025-08-15 PROBLEM — E03.9 HYPOTHYROIDISM, UNSPECIFIED: Status: ACTIVE | Noted: 2025-08-15

## 2025-08-15 PROCEDURE — 99204 OFFICE O/P NEW MOD 45 MIN: CPT | Performed by: INTERNAL MEDICINE

## 2025-08-15 PROCEDURE — 3074F SYST BP LT 130 MM HG: CPT | Performed by: INTERNAL MEDICINE

## 2025-08-15 PROCEDURE — 3078F DIAST BP <80 MM HG: CPT | Performed by: INTERNAL MEDICINE

## 2025-08-15 RX ORDER — ERGOCALCIFEROL 1.25 MG/1
1 CAPSULE ORAL
COMMUNITY

## 2025-08-15 RX ORDER — LIOTHYRONINE SODIUM 5 UG/1
5 TABLET ORAL DAILY
COMMUNITY

## 2025-08-15 RX ORDER — LEVOTHYROXINE SODIUM 50 UG/1
50 TABLET ORAL
COMMUNITY

## 2025-08-15 ASSESSMENT — PATIENT HEALTH QUESTIONNAIRE - PHQ9: CLINICAL INTERPRETATION OF PHQ2 SCORE: 0

## 2025-08-15 ASSESSMENT — LIFESTYLE VARIABLES
HOW OFTEN DO YOU HAVE SIX OR MORE DRINKS ON ONE OCCASION: NEVER
HOW OFTEN DO YOU HAVE A DRINK CONTAINING ALCOHOL: MONTHLY OR LESS
HOW MANY STANDARD DRINKS CONTAINING ALCOHOL DO YOU HAVE ON A TYPICAL DAY: PATIENT DOES NOT DRINK
AUDIT-C TOTAL SCORE: 1
SKIP TO QUESTIONS 9-10: 1